# Patient Record
(demographics unavailable — no encounter records)

---

## 2025-03-03 NOTE — REVIEW OF SYSTEMS
[Abn Vaginal bleeding] : abnormal vaginal bleeding [Breast Pain] : breast pain [Breast Lump] : breast lump [Negative] : Gastrointestinal [Pelvic pain] : no pelvic pain

## 2025-03-03 NOTE — PHYSICAL EXAM
[Chaperone Present] : A chaperone was present in the examining room during all aspects of the physical examination [Breast Hypertrophy Of The Right Breast] : hypertrophy [Enlargement Of The Right Breast] : swelling [Tenderness Of The Right Breast] : tenderness [] : multiple nodules were palpated [The Left Breast Was Examined] : a normal appearance [Breast Mass Left Breast ___cm] : no mass was palpable [Labia Majora] : normal [Labia Minora] : normal [Normal] : normal [Uterine Adnexae] : normal [FreeTextEntry2] : PEG

## 2025-03-03 NOTE — HISTORY OF PRESENT ILLNESS
[Abnormal Quantity] : abnormal quantity [Abnormal Duration] : abnormal duration [Heavy Bleeding] : heavy bleeding [FreeTextEntry1] : HERE FOR FU DUE TO RIGHT BREAST PAIN, LUMP, AND FIRMNESS, AND NIPPLE TENDERNESS AND ERECTNESS X 3 WEEKS HAD NEGATIVE IMAGING , BUT STILL VERY CONCERNED DUE TO HER FAMILY HISTORY ALSO HERE FOR HER PAP AND EVALUATION OF HER 7 DAY HEAVY MENSES STOPPED THE PILL DUE TO HER BREAST ISSUES

## 2025-03-03 NOTE — PROCEDURE
[Abnormal Uterine Bleeding] : abnormal uterine bleeding [Transvaginal Ultrasound] : transvaginal ultrasound [FreeTextEntry3] : 44.9 CC VOL,3 MM NO FREE FLUID CERVIX NORMAL [FreeTextEntry5] : 45 CC VOL, 3 MM [FreeTextEntry7] : 5.6 CC [FreeTextEntry8] : 6.5 CC